# Patient Record
Sex: MALE | Race: OTHER | HISPANIC OR LATINO | Employment: UNEMPLOYED | URBAN - METROPOLITAN AREA
[De-identification: names, ages, dates, MRNs, and addresses within clinical notes are randomized per-mention and may not be internally consistent; named-entity substitution may affect disease eponyms.]

---

## 2021-10-09 ENCOUNTER — HOSPITAL ENCOUNTER (EMERGENCY)
Facility: HOSPITAL | Age: 26
Discharge: HOME | End: 2021-10-10
Attending: EMERGENCY MEDICINE
Payer: MEDICAID

## 2021-10-09 VITALS
HEART RATE: 72 BPM | RESPIRATION RATE: 16 BRPM | TEMPERATURE: 98 F | HEIGHT: 75 IN | BODY MASS INDEX: 36.06 KG/M2 | SYSTOLIC BLOOD PRESSURE: 126 MMHG | OXYGEN SATURATION: 98 % | DIASTOLIC BLOOD PRESSURE: 59 MMHG | WEIGHT: 290 LBS

## 2021-10-09 DIAGNOSIS — T15.02XA FOREIGN BODY OF LEFT CORNEA, INITIAL ENCOUNTER: Primary | ICD-10-CM

## 2021-10-09 PROCEDURE — 25000003 PHARM REV CODE 250: Performed by: PHYSICIAN ASSISTANT

## 2021-10-09 PROCEDURE — 65220 REMOVE FOREIGN BODY FROM EYE: CPT

## 2021-10-09 PROCEDURE — 99284 EMERGENCY DEPT VISIT MOD MDM: CPT | Mod: 25

## 2021-10-09 RX ORDER — TETRACAINE HYDROCHLORIDE 5 MG/ML
2 SOLUTION OPHTHALMIC
Status: COMPLETED | OUTPATIENT
Start: 2021-10-09 | End: 2021-10-09

## 2021-10-09 RX ORDER — ERYTHROMYCIN 5 MG/G
OINTMENT OPHTHALMIC
Status: COMPLETED | OUTPATIENT
Start: 2021-10-10 | End: 2021-10-10

## 2021-10-09 RX ADMIN — FLUORESCEIN SODIUM 1 EACH: 1 STRIP OPHTHALMIC at 11:10

## 2021-10-09 RX ADMIN — TETRACAINE HYDROCHLORIDE 2 DROP: 5 SOLUTION OPHTHALMIC at 11:10

## 2021-10-10 PROCEDURE — 25000003 PHARM REV CODE 250: Performed by: PHYSICIAN ASSISTANT

## 2021-10-10 PROCEDURE — 63600175 PHARM REV CODE 636 W HCPCS: Performed by: PHYSICIAN ASSISTANT

## 2021-10-10 PROCEDURE — 90714 TD VACC NO PRESV 7 YRS+ IM: CPT | Performed by: PHYSICIAN ASSISTANT

## 2021-10-10 PROCEDURE — 90471 IMMUNIZATION ADMIN: CPT | Performed by: PHYSICIAN ASSISTANT

## 2021-10-10 RX ORDER — ERYTHROMYCIN 5 MG/G
OINTMENT OPHTHALMIC EVERY 4 HOURS
Qty: 1 TUBE | Refills: 0 | Status: SHIPPED | OUTPATIENT
Start: 2021-10-10 | End: 2021-10-17

## 2021-10-10 RX ADMIN — ERYTHROMYCIN 1 INCH: 5 OINTMENT OPHTHALMIC at 12:10

## 2021-10-10 RX ADMIN — CLOSTRIDIUM TETANI TOXOID ANTIGEN (FORMALDEHYDE INACTIVATED) AND CORYNEBACTERIUM DIPHTHERIAE TOXOID ANTIGEN (FORMALDEHYDE INACTIVATED) 0.5 ML: 5; 2 INJECTION, SUSPENSION INTRAMUSCULAR at 12:10

## 2025-03-04 ENCOUNTER — HOSPITAL ENCOUNTER (EMERGENCY)
Facility: HOSPITAL | Age: 30
Discharge: HOME | End: 2025-03-04
Attending: EMERGENCY MEDICINE
Payer: COMMERCIAL

## 2025-03-04 VITALS
HEIGHT: 74 IN | TEMPERATURE: 98 F | BODY MASS INDEX: 38.5 KG/M2 | RESPIRATION RATE: 20 BRPM | DIASTOLIC BLOOD PRESSURE: 89 MMHG | WEIGHT: 300 LBS | OXYGEN SATURATION: 98 % | SYSTOLIC BLOOD PRESSURE: 185 MMHG | HEART RATE: 88 BPM

## 2025-03-04 DIAGNOSIS — H16.001 CORNEAL ULCER OF RIGHT EYE: ICD-10-CM

## 2025-03-04 DIAGNOSIS — H57.89 IRRITATION OF RIGHT EYE: Primary | ICD-10-CM

## 2025-03-04 PROCEDURE — 25000003 PHARM REV CODE 250

## 2025-03-04 PROCEDURE — 99284 EMERGENCY DEPT VISIT MOD MDM: CPT

## 2025-03-04 RX ORDER — KETOROLAC TROMETHAMINE 5 MG/ML
1 SOLUTION OPHTHALMIC EVERY 6 HOURS
Qty: 5 ML | Refills: 0 | Status: SHIPPED | OUTPATIENT
Start: 2025-03-04 | End: 2025-03-14

## 2025-03-04 RX ORDER — TETRACAINE HYDROCHLORIDE 5 MG/ML
2 SOLUTION OPHTHALMIC
Status: COMPLETED | OUTPATIENT
Start: 2025-03-04 | End: 2025-03-04

## 2025-03-04 RX ORDER — MOXIFLOXACIN 5 MG/ML
1 SOLUTION/ DROPS OPHTHALMIC 3 TIMES DAILY
Qty: 3 ML | Refills: 0 | Status: SHIPPED | OUTPATIENT
Start: 2025-03-04

## 2025-03-04 RX ADMIN — TETRACAINE HYDROCHLORIDE 2 DROP: 5 SOLUTION OPHTHALMIC at 07:03

## 2025-03-04 RX ADMIN — FLUORESCEIN SODIUM 1 EACH: 1 STRIP OPHTHALMIC at 07:03

## 2025-03-04 NOTE — Clinical Note
"Danis"Danilo" Reyes was seen and treated in our emergency department on 3/4/2025.  He may return to work on 03/07/2025.       If you have any questions or concerns, please don't hesitate to call.      Matt Floyd PA-C"

## 2025-03-05 NOTE — DISCHARGE INSTRUCTIONS
You were seen in the emergency department today for eye redness.  Please take all medications as prescribed and as we discussed.  Follow-up with specialist if instructed to do so.  It is important to remember that some problems are difficult to diagnose and may not be found during your Emergency Department visit. Be sure to follow up with your primary care doctor and review all labs/imaging/tests that were performed during this visit with them. Some labs/tests may be outside of the normal range and require non-emergent follow-up and further investigation to help diagnose/exclude/prevent complications or other medical conditions. Return to the emergency department for any new or worsening symptoms. Thank you for allowing me to care for you today, it was my pleasure. I hope you get to feeling better soon!

## 2025-03-05 NOTE — ED PROVIDER NOTES
Encounter Date: 3/4/2025       History     Chief Complaint   Patient presents with    Eye Problem     Pt arrived in ED, c/o right eye irritation. Pt reports cleaning out insulation and some flew in his eye. Pt states he's flushed it out but now his eye is just irritated. Pt denies having any other pain or complaints.      Patient is a 29-year-old male with no pertinent past medical history who presents to the emergency department for evaluation of right eye irritation that began prior to arrival.  Reports doing some work at home with insulation and when carrying it outside the wind blew some into his eye. He reports cleaning his eye out extensively with water.  Denies foreign body sensation.  Reports redness.  Denies changes in vision.  Reports tearing.  Denies fever, nausea, vomiting.  Denies contact lens use.  No other complaints.    The history is provided by the patient.     Review of patient's allergies indicates:  No Known Allergies  No past medical history on file.  No past surgical history on file.  No family history on file.  Social History[1]  Review of Systems   Constitutional:  Negative for chills and fever.   HENT:  Negative for congestion.    Eyes:  Positive for pain, discharge and redness. Negative for photophobia and visual disturbance.   Respiratory:  Negative for cough.    Gastrointestinal:  Negative for nausea and vomiting.   Neurological:  Negative for dizziness, light-headedness and headaches.       Physical Exam     Initial Vitals [03/04/25 1843]   BP Pulse Resp Temp SpO2   (!) 185/89 88 20 98.4 °F (36.9 °C) 98 %      MAP       --         Physical Exam    Nursing note and vitals reviewed.  Constitutional: He appears well-developed and well-nourished.   HENT:   Head: Normocephalic and atraumatic.   Right Ear: External ear normal.   Left Ear: External ear normal.   Eyes: Conjunctivae and EOM are normal. Pupils are equal, round, and reactive to light.   There is conjunctival injection to the right  eye with tearing.  PERRLA.  EOM intact.  Corneal ulceration on Wood's lamp exam.  No abrasions.  Negative Hanna sign.  No dendritic lesions.  No foreign body.   Neck: Carotid bruit is not present.   Normal range of motion.  Cardiovascular:  Normal rate, regular rhythm, normal heart sounds and intact distal pulses.     Exam reveals no gallop and no friction rub.       No murmur heard.  Pulmonary/Chest: Breath sounds normal. No respiratory distress. He has no wheezes. He has no rhonchi. He has no rales.   Musculoskeletal:         General: Normal range of motion.      Cervical back: Normal range of motion.     Neurological: He is alert and oriented to person, place, and time. GCS score is 15. GCS eye subscore is 4. GCS verbal subscore is 5. GCS motor subscore is 6.   Psychiatric: He has a normal mood and affect.         ED Course   Procedures  Labs Reviewed - No data to display       Imaging Results    None          Medications   fluorescein ophthalmic strip 1 each (1 each Left Eye Given 3/4/25 1915)   TETRAcaine HCl (PF) 0.5 % Drop 2 drop (2 drops Left Eye Given 3/4/25 1915)     Medical Decision Making  This is an emergent evaluation of a 29-year-old male with no pertinent past medical history who presents to the emergency department for evaluation of right eye irritation that began prior to arrival.  Reports doing some work at home with insulation and when carrying it outside the wind blew some into his eye.    Patient looks well clinically. There is conjunctival injection to the right eye with tearing.  PERRLA.  EOM intact.  Corneal ulceration on Wood's lamp exam.  No abrasions.  Negative Hanna sign.  No dendritic lesions.  No foreign body. Regular rate rhythm without murmurs.  No carotid bruits appreciated on exam. Lungs are clear to auscultation bilaterally.     Differential diagnosis includes but is not limited to eye irritation, corneal abrasion, corneal ulceration, foreign body, bacterial conjunctivitis,  allergic conjunctivitis, viral conjunctivitis.    Vital signs, chart, labs, and/or imaging were all reviewed.  See ED course below and interpretations above. My overall impression is corneal ulceration, eye irritation. Will discharge home with Acular, Vigamox with Ophthalmology follow-up. Patient is very well appearing, and in no acute distress. Vital signs are reassuring here in the emergency department, patient is afebrile, breathing comfortable, satting 98 % on room air. Patient/Caregiver is stable for discharge at this time.  Patient/Caregiver was informed of results and plan of care. Patient/Caregiver verbalized understanding of care plan. All questions and concerns were addressed. Discussed strict return precautions with the patient/caregiver. Instructed follow up with primary care provider within 1 week.      Mtat Floyd PA-C    DISCLAIMER: This note was prepared with [employer] voice recognition transcription software. Garbled syntax, mangled pronouns, and other bizarre constructions may be attributed to that software system.       Risk  Prescription drug management.               ED Course as of 03/04/25 1922   Tue Mar 04, 2025   1905 BP(!): 185/89 [TM]   1905 MAP (mmHg): 126 [TM]   1905 Temp: 98.4 °F (36.9 °C) [TM]   1905 Pulse: 88 [TM]   1905 Resp: 20 [TM]   1905 SpO2: 98 % [TM]   1919 Unable to do tonometry as tonopen is not functioning. No changes in vision, doubt glaucoma.  [TM]      ED Course User Index  [TM] Matt Floyd PA-C                           Clinical Impression:  Final diagnoses:  [H57.89] Irritation of right eye (Primary)  [H16.001] Corneal ulcer of right eye          ED Disposition Condition    Discharge Stable          ED Prescriptions       Medication Sig Dispense Start Date End Date Auth. Provider    ketorolac 0.5% (ACULAR) 0.5 % Drop Place 1 drop into both eyes every 6 (six) hours. for 10 days 5 mL 3/4/2025 3/14/2025 Matt Floyd PA-C    moxifloxacin (VIGAMOX) 0.5 %  ophthalmic solution Place 1 drop into the right eye 3 (three) times daily. 3 mL 3/4/2025 -- Matt Floyd PA-C          Follow-up Information       Follow up With Specialties Details Why Contact Info    [facility] Emergency [facility] Emergency Medicine Go to  As needed, If symptoms worsen, or new symptoms develop Radha Edwards  [facility] [place] [id number] [phone]    Primary care doctor  Schedule an appointment as soon as possible for a visit in 3 days      [facility] Triage Line  Call in 1 day To schedule appointment Please call the triage line on the next business day to schedule an appointment with the eye doctor.   [phone]               [1]   Social History  Tobacco Use    Smoking status: Never   Substance Use Topics    Alcohol use: No    Drug use: Yes        Matt Floyd PA-C  03/04/25 1922